# Patient Record
Sex: FEMALE | Race: WHITE | Employment: UNEMPLOYED | URBAN - METROPOLITAN AREA
[De-identification: names, ages, dates, MRNs, and addresses within clinical notes are randomized per-mention and may not be internally consistent; named-entity substitution may affect disease eponyms.]

---

## 2020-02-26 ENCOUNTER — APPOINTMENT (OUTPATIENT)
Dept: GENERAL RADIOLOGY | Age: 55
End: 2020-02-26
Payer: MEDICAID

## 2020-02-26 ENCOUNTER — HOSPITAL ENCOUNTER (EMERGENCY)
Age: 55
Discharge: HOME | End: 2020-02-26
Attending: EMERGENCY MEDICINE
Payer: MEDICAID

## 2020-02-26 VITALS
WEIGHT: 175.04 LBS | HEIGHT: 64 IN | HEART RATE: 60 BPM | OXYGEN SATURATION: 99 % | DIASTOLIC BLOOD PRESSURE: 60 MMHG | SYSTOLIC BLOOD PRESSURE: 120 MMHG | TEMPERATURE: 98 F | RESPIRATION RATE: 16 BRPM | BODY MASS INDEX: 29.88 KG/M2

## 2020-02-26 PROCEDURE — 71101 X-RAY EXAM UNILAT RIBS/CHEST: CPT

## 2020-02-26 PROCEDURE — 99283 EMERGENCY DEPT VISIT LOW MDM: CPT

## 2020-02-26 RX ORDER — ATORVASTATIN CALCIUM 40 MG/1
TABLET, FILM COATED ORAL
COMMUNITY
Start: 2020-02-20

## 2020-02-26 RX ORDER — HYDROCODONE BITARTRATE AND ACETAMINOPHEN 5; 325 MG/1; MG/1
1 TABLET ORAL EVERY 6 HOURS PRN
Qty: 12 TABLET | Refills: 0 | Status: SHIPPED | OUTPATIENT
Start: 2020-02-26 | End: 2020-02-29

## 2020-02-26 RX ORDER — CLONAZEPAM 0.5 MG/1
TABLET ORAL
COMMUNITY
Start: 2020-02-21

## 2020-02-26 ASSESSMENT — PAIN SCALES - GENERAL
PAINLEVEL_OUTOF10: 7
PAINLEVEL_OUTOF10: 6

## 2020-02-26 ASSESSMENT — PAIN DESCRIPTION - PAIN TYPE
TYPE: ACUTE PAIN
TYPE: ACUTE PAIN

## 2020-02-26 ASSESSMENT — PAIN DESCRIPTION - ONSET: ONSET: SUDDEN

## 2020-02-26 ASSESSMENT — PAIN DESCRIPTION - LOCATION
LOCATION: FLANK
LOCATION: RIB CAGE

## 2020-02-26 ASSESSMENT — PAIN DESCRIPTION - ORIENTATION
ORIENTATION: RIGHT
ORIENTATION: RIGHT

## 2020-02-26 ASSESSMENT — PAIN DESCRIPTION - FREQUENCY
FREQUENCY: INTERMITTENT
FREQUENCY: CONTINUOUS

## 2020-02-26 ASSESSMENT — PAIN DESCRIPTION - DESCRIPTORS
DESCRIPTORS: ACHING
DESCRIPTORS: SHARP

## 2020-02-26 ASSESSMENT — PAIN - FUNCTIONAL ASSESSMENT
PAIN_FUNCTIONAL_ASSESSMENT: ACTIVITIES ARE NOT PREVENTED
PAIN_FUNCTIONAL_ASSESSMENT: 0-10
PAIN_FUNCTIONAL_ASSESSMENT: PREVENTS OR INTERFERES SOME ACTIVE ACTIVITIES AND ADLS

## 2020-02-26 ASSESSMENT — PAIN DESCRIPTION - PROGRESSION: CLINICAL_PROGRESSION: NOT CHANGED

## 2020-02-26 NOTE — ED PROVIDER NOTES
[facility]  eMERGENCY dEPARTMENT eNCOUnter      Pt Name: Meng Meza  MRN: [id number]  [place] 1965  Date of evaluation: 2/26/2020  Provider: Mini Underwood MD    CHIEF COMPLAINT       Chief Complaint   Patient presents with    Rib Pain     right rib pain accidentally slammed by a car door 1 week ago         HISTORY OF PRESENT ILLNESS  (Location/Symptom, Timing/Onset, Context/Setting, Quality, Duration, Modifying Factors, Severity.)   Meng Meza is a 47 y.o. female who presents to the emergency department for complaint of right rib pain for 1 week due to hitting herself accidentally with a car door. Reports pain starts under her shoulder and radiates across to her chest. States pain is worse with movement and coughing. Reports feeling a \"popping\" sensation when she raises her arm. Denies shortness of breath or coughing up blood. Reports taking Tylenol last night to help with pain. Nursing Notes were reviewed and I agree. REVIEW OF SYSTEMS    (2-9 systems for level 4, 10 or more for level 5)     Cardiac: Denies chest pain  Respiratory: Denies shortness of breath or coughing up blood. She has a chronic smoker's cough which is no worse than usual.  Abdomen: Denies abdominal pain, nausea or vomiting  MSK: Reports right sided rib pain     Except as noted above the remainder of the review of systems was reviewed and negative.        PAST MEDICAL HISTORY         Diagnosis Date    HTN (hypertension)     LDL (low density lipoprotein receptor disorder)     Panic disorder     Smokers' cough ([address]        SURGICAL HISTORY           Procedure Laterality Date    TUBAL LIGATION         CURRENT MEDICATIONS       Discharge Medication List as of 2/26/2020 12:49 PM      CONTINUE these medications which have NOT CHANGED    Details   atorvastatin (LIPITOR) 40 MG tablet Historical Med      sertraline (ZOLOFT) 50 MG tablet Historical Med      clonazePAM (KLONOPIN) 0.5 MG tablet Historical Med      BREO ELLIPTA 200-25 MCG/INH AEPB inhaler DAWHistorical Med      cetirizine (ZYRTEC) 5 MG tablet Take 10 mg by mouth daily       albuterol sulfate  (90 BASE) MCG/ACT inhaler Inhale 2 puffs into the lungs every 6 hours as needed for Wheezing      lisinopril (PRINIVIL;ZESTRIL) 40 MG tablet Take 40 mg by mouth daily      albuterol (PROVENTIL) (2.5 MG/3ML) 0.083% nebulizer solution Take 2.5 mg by nebulization every 6 hours as needed. ALLERGIES     Stadol [butorphanol]    FAMILY HISTORY     History reviewed. No pertinent family history. No family status information on file. SOCIAL HISTORY      reports that she has been smoking cigarettes. She has a 30.00 pack-year smoking history. She has never used smokeless tobacco. She reports current alcohol use. She reports that she does not use drugs. PHYSICAL EXAM    (up to 7 for level 4, 8 or more for level 5)     ED Triage Vitals [02/26/20 1157]   BP Temp Temp Source Pulse Resp SpO2 Height Weight   122/61 97.7 °F (36.5 °C) Oral 61 16 98 % 5' 4\" (1.626 m) 175 lb 0.7 oz (79.4 kg)       General: Alert and oriented x 4, in no acute distress. Respiratory: Lung sounds clear throughout all lung fields. No rhonchi, wheezes, rales, or stridor. Speaking in clear and full sentences. Cardiac: Normal S1S2. No murmurs or gallops  Abdomen: Active bowel sounds in all four quadrants. Soft and non tender. No organomegaly. MSK  Ribs: No bony step offs. No crepitus. Tenderness at mid anterior lateral right side  Skin: Warm to touch and appropriate for ethnicity. No ecchymosis, abrasions, or lacerations.          DIAGNOSTIC RESULTS     RADIOLOGY:   Non-plain film images such as CT, Ultrasound and MRI are read by the radiologist. Plain radiographic images are visualized and preliminarily interpreted by Estephanie Iraheta MD with the below findings:      Interpretation per the Radiologist below, if available at the time of this note:    XR RIBS RIGHT INCLUDE CHEST (MIN 3 VIEWS)   Final Result   Acute mildly displaced fracture laterally in the right 4th rib. No pleural   effusion or pneumothorax. Clear lungs. Mild bullous changes. LABS:  Labs Reviewed - No data to display    All other labs were within normal range or not returned as of this dictation. EMERGENCY DEPARTMENT COURSE and DIFFERENTIAL DIAGNOSIS/MDM:   Vitals:    Vitals:    02/26/20 1157 02/26/20 1245   BP: 122/61 120/60   Pulse: 61 60   Resp: 16 16   Temp: 97.7 °F (36.5 °C) 98 °F (36.7 °C)   TempSrc: Oral Oral   SpO2: 98% 99%   Weight: 175 lb 0.7 oz (79.4 kg)    Height: 5' 4\" (1.626 m)        This patient was hit by a car door in her right ribs. She has had some persistent pain which is worse with movement and deep breathing. She has had no shortness of breath or hemoptysis. She has some point tenderness over her right lateral mid chest wall in the midaxillary line around the fourth and fifth rib region. She has no crepitance. Chest x-ray shows a right fourth rib fracture which is not significantly displaced. There is no evidence of pneumothorax, hemothorax, or other acute cardiopulmonary abnormality. She will be treated symptomatically with short-term pain control. She was instructed on the importance of remaining active and using incentive spirometry. She has incentive spirometer at home. She was warned of the symptoms that should prompt her return including but not limited to coughing up blood, shortness of breath, fever. She is to follow-up with her primary care provider as instructed. X-ray results, diagnosis, and treatment plan were discussed with the patient. She understands the treatment plan and follow-up as discussed.     Controlled Substance Monitoring:    Acute and Chronic Pain Monitoring:   RX Monitoring 2/26/2020   Periodic Controlled Substance Monitoring Possible medication side effects, risk of tolerance/dependence & alternative treatments

## 2022-10-13 ENCOUNTER — HOSPITAL ENCOUNTER (EMERGENCY)
Age: 57
Discharge: HOME | End: 2022-10-13
Attending: EMERGENCY MEDICINE
Payer: COMMERCIAL

## 2022-10-13 ENCOUNTER — APPOINTMENT (OUTPATIENT)
Dept: GENERAL RADIOLOGY | Age: 57
End: 2022-10-13
Payer: COMMERCIAL

## 2022-10-13 VITALS
HEIGHT: 64 IN | DIASTOLIC BLOOD PRESSURE: 80 MMHG | BODY MASS INDEX: 27.36 KG/M2 | RESPIRATION RATE: 18 BRPM | OXYGEN SATURATION: 96 % | TEMPERATURE: 97.6 F | SYSTOLIC BLOOD PRESSURE: 115 MMHG | HEART RATE: 70 BPM | WEIGHT: 160.27 LBS

## 2022-10-13 DIAGNOSIS — S29.011A CHEST WALL MUSCLE STRAIN, INITIAL ENCOUNTER: Primary | ICD-10-CM

## 2022-10-13 PROCEDURE — 71101 X-RAY EXAM UNILAT RIBS/CHEST: CPT

## 2022-10-13 PROCEDURE — 99283 EMERGENCY DEPT VISIT LOW MDM: CPT

## 2022-10-13 ASSESSMENT — PAIN DESCRIPTION - FREQUENCY: FREQUENCY: CONTINUOUS

## 2022-10-13 ASSESSMENT — PAIN DESCRIPTION - LOCATION: LOCATION: RIB CAGE

## 2022-10-13 ASSESSMENT — PAIN DESCRIPTION - PAIN TYPE: TYPE: ACUTE PAIN

## 2022-10-13 ASSESSMENT — PAIN DESCRIPTION - ORIENTATION: ORIENTATION: LEFT;MID

## 2022-10-13 ASSESSMENT — PAIN DESCRIPTION - DESCRIPTORS: DESCRIPTORS: SHARP;SHOOTING

## 2022-10-13 ASSESSMENT — PAIN - FUNCTIONAL ASSESSMENT: PAIN_FUNCTIONAL_ASSESSMENT: 0-10

## 2022-10-13 NOTE — DISCHARGE INSTRUCTIONS
May take Naprosyn or Aleve or Advil is fine for this. Take Robitussin-DM to help with your cough. No evidence of infection. No pneumonia. Decrease smoking. Should get better in couple weeks. Follow-up.

## 2022-10-13 NOTE — ED PROVIDER NOTES
eMERGENCY dEPARTMENT eNCOUnter      Pt Name: Lucila Smith  MRN: [id number]  [place] 1965  Date of evaluation: 10/13/2022  Provider: Gregorio Tony MD     [address] [place] [address]       Chief Complaint   Patient presents with    Rib Pain (injury)     Patient arrives to Ed with complaint of possible \"cracked\"rib on left side since last night . Patient states no known injury but has been coughing for 5 days . She called her pcp and couldn't get in. Patient rates pain 9/10, and denies otc pain medication today but took tylenol yesterday. HISTORY OF PRESENT ILLNESS   (Location/Symptom, Timing/Onset,Context/Setting, Quality, Duration, Modifying Factors, Severity) Note limiting factors. HPI    Lucila Smith is a 62 y.o. female who presents to the emergency department with left-sided rib pain for about 5 days. Patient states she is a COPD year continues to smoke has been coughing for about 5 days. Patient was unable to get to her doctor because her doctor has been busy and it is booked. Patient states now it hurts to take a deep breath. There has been no trauma. Patient denies falling but it hurts to palpate on that left rib area. Patient is concerned for a rib fracture. Patient denies shortness of breath. Patient is on an inhaler for her COPD. There has been no fever. She denies any chest pain except for reproducible chest wall pain. Nursing Notes were reviewed. REVIEW OFSYSTEMS    (2+ for level 4; 10+ for level 5)   Review of Systems    General: No fevers, chills or night sweats, No weight loss    Head:  No Sore throat,  No Ear Pain    Chest:  Nontender. Positive cough, no SOB,  Chest Pain wall pain is positive    GI: No abdominal pain or vomiting    : No dysuria or hematuria    Musculoskeletal: No unrelenting pain or night pain    Neurologic: No bowel or bladder incontinence, No saddle anesthesia, No leg weakness    All other systems reviewed and are negative.         PAST MEDICAL HISTORY Past Medical History:   Diagnosis Date    COPD (chronic obstructive pulmonary disease) (HCC)     HTN (hypertension)     LDL (low density lipoprotein receptor disorder)     Panic disorder     Smokers' cough ([address]        SURGICAL HISTORY       Past Surgical History:   Procedure Laterality Date    TUBAL LIGATION         CURRENT MEDICATIONS       Previous Medications    ALBUTEROL (PROVENTIL) (2.5 MG/3ML) 0.083% NEBULIZER SOLUTION    Take 2.5 mg by nebulization every 6 hours as needed. ALBUTEROL SULFATE  (90 BASE) MCG/ACT INHALER    Inhale 2 puffs into the lungs every 6 hours as needed for Wheezing    ATORVASTATIN (LIPITOR) 40 MG TABLET        BREO ELLIPTA 200-25 MCG/INH AEPB INHALER        CETIRIZINE (ZYRTEC) 5 MG TABLET    Take 10 mg by mouth daily     CLONAZEPAM (KLONOPIN) 0.5 MG TABLET        LISINOPRIL (PRINIVIL;ZESTRIL) 40 MG TABLET    Take 40 mg by mouth daily    SERTRALINE (ZOLOFT) 50 MG TABLET           ALLERGIES     Stadol [butorphanol]    FAMILY HISTORY     History reviewed. No pertinent family history. SOCIAL HISTORY       Social History     Socioeconomic History    Marital status:      Spouse name: None    Number of children: None    Years of education: None    Highest education level: None   Tobacco Use    Smoking status: Every Day     Packs/day: 1.00     Years: 30.00     Pack years: 30.00     Types: Cigarettes    Smokeless tobacco: Never   Vaping Use    Vaping Use: Former   Substance and Sexual Activity    Alcohol use: Not Currently     Alcohol/week: 5.0 - 6.0 standard drinks     Types: 5 - 6 Cans of beer per week    Drug use: No       SCREENINGS           PHYSICAL EXAM    (up to 7 for level 4, 8 or more for level 5)     ED Triage Vitals [10/13/22 0947]   BP Temp Temp Source Heart Rate Resp SpO2 Height Weight   115/80 97.6 °F (36.4 °C) Tympanic 70 18 96 % 5' 4\" (1.626 m) 160 lb 4.4 oz (72.7 kg)       Physical Exam    General: Alert and awake ×3. Nontoxic appearance. Well-developed well-nourished 60-year-old in no distress  HEENT: Normocephalic atraumatic. Neck is supple. Airway intact. No adenopathy  Cardiac: Regular rate and rhythm with no murmurs rubs or gallops. Positive chest wall tenderness on the left side. Pulmonary: Lungs are clear in all lung fields. No wheezing. No Rales. Abdomen: Soft and nontender. Negative hepatosplenomegaly. Bowel sounds are active  Extremities: Moving all extremities. No calf tenderness. Peripheral pulses all intact  Skin: No skin lesions. No rashes. I do not see any evidence of rash or shingles on that left dermatome area of her chest  Neurologic: Cranial nerves II through XII was grossly intact. Nonfocal neurological exam  Psychiatric: Patient is pleasant. Mood is appropriate. DIAGNOSTIC RESULTS     EKG (Per Emergency Physician):       RADIOLOGY (Per Emergency Physician): Interpretation per the Radiologist below, if available at the time of this note:  XR RIBS LEFT INCLUDE CHEST (MIN 3 VIEWS)    Result Date: 10/13/2022  EXAMINATION: 4 XRAY VIEWS OF THE LEFT RIBS WITH FRONTAL XRAY VIEW OF THE CHEST 10/13/2022 10:02 am COMPARISON: None. HISTORY: ORDERING SYSTEM PROVIDED HISTORY: pain TECHNOLOGIST PROVIDED HISTORY: Reason for exam:->pain Reason for Exam: complaint of possible \"cracked\"rib on left side since last night . Patient states no known injury but has been coughing for 5 days FINDINGS: There is no consolidation, pneumothorax or effusion. Heart size and vascularity are normal.  Examination of the left ribs reveals no fracture or malalignment. ED BEDSIDE ULTRASOUND:   Performed by ED Physician - none    LABS:  Labs Reviewed - No data to display     All other labs were within normal range or not returned as of this dictation.       Procedures      EMERGENCY DEPARTMENT COURSE and DIFFERENTIAL DIAGNOSIS/MDM:   Vitals:    Vitals:    10/13/22 0947   BP: 115/80   Pulse: 70   Resp: 18   Temp: 97.6 °F (36.4 °C) TempSrc: Tympanic   SpO2: 96%   Weight: 160 lb 4.4 oz (72.7 kg)   Height: 5' 4\" (1.626 m)       Medications - No data to display    MDM  . Patient is a 45-year-old COPD continues to smoke with left-sided chest pain. It is reproducible. Exam is reassuring. Differential diagnosis includes pneumonia, rib fracture, contusion, rib muscle strain, pneumothorax, costochondritis. X-ray was obtained of the ribs to rule out fracture. Results of that reveals no evidence of fracture no pneumonia no pneumothorax. I suspect patient has a pulled muscle or muscle strain of her chest wall from coughing. Recommend Robitussin-DM. Naprosyn Aleve or any other NSAIDs should help with the pain. Reassurance for patient. Patient discharged in good condition. Follow-up in a week if not improved. REVAL:         CRITICAL CARE TIME   Total CriticalCare time was 0 minutes, excluding separately reportable procedures. There was a high probability of clinically significant/life threatening deterioration in the patient's condition which required my urgent intervention. CONSULTS:  None    PROCEDURES:  Unless otherwise noted below, none     [unfilled]    FINAL IMPRESSION      1. Chest wall muscle strain, initial encounter          DISPOSITION/PLAN   DISPOSITION Decision To Discharge 10/13/2022 10:23:38 AM      PATIENT REFERRED TO:  Asha Ugarte MD  [address]  [phone]    Schedule an appointment as soon as possible for a visit in 1 week  If symptoms worsen    DISCHARGE MEDICATIONS:  New Prescriptions    No medications on file          (Please note:  Portions of this note were completed with a voice recognition program.Efforts were made to edit the dictations but occasionally words and phrases are mis-transcribed.)  Form [id number]    ROXANE CRABTREE MD (electronically signed)  Emergency Medicine Provider       Víctor Cortez MD  10/13/22 2670

## 2022-10-13 NOTE — ED NOTES
Discharge instructions reviewed. Patient verbalized understanding.        Tito Manuel RN  10/13/22 0162

## 2024-08-07 ENCOUNTER — HOSPITAL ENCOUNTER (EMERGENCY)
Age: 59
Discharge: HOME | End: 2024-08-07
Attending: EMERGENCY MEDICINE
Payer: COMMERCIAL

## 2024-08-07 ENCOUNTER — APPOINTMENT (OUTPATIENT)
Dept: GENERAL RADIOLOGY | Age: 59
End: 2024-08-07
Payer: COMMERCIAL

## 2024-08-07 VITALS
HEIGHT: 64 IN | WEIGHT: 174.16 LBS | RESPIRATION RATE: 18 BRPM | BODY MASS INDEX: 29.73 KG/M2 | DIASTOLIC BLOOD PRESSURE: 76 MMHG | OXYGEN SATURATION: 98 % | TEMPERATURE: 98.2 F | SYSTOLIC BLOOD PRESSURE: 138 MMHG | HEART RATE: 88 BPM

## 2024-08-07 DIAGNOSIS — S22.32XA CLOSED FRACTURE OF ONE RIB OF LEFT SIDE, INITIAL ENCOUNTER: Primary | ICD-10-CM

## 2024-08-07 PROCEDURE — 6370000000 HC RX 637 (ALT 250 FOR IP): Performed by: EMERGENCY MEDICINE

## 2024-08-07 PROCEDURE — 71101 X-RAY EXAM UNILAT RIBS/CHEST: CPT

## 2024-08-07 PROCEDURE — 99283 EMERGENCY DEPT VISIT LOW MDM: CPT

## 2024-08-07 RX ORDER — IBUPROFEN 600 MG/1
600 TABLET ORAL ONCE
Status: COMPLETED | OUTPATIENT
Start: 2024-08-07 | End: 2024-08-07

## 2024-08-07 RX ORDER — CLONAZEPAM 1 MG/1
1 TABLET ORAL DAILY PRN
COMMUNITY
Start: 2024-06-20

## 2024-08-07 RX ORDER — CYCLOBENZAPRINE HCL 10 MG
10 TABLET ORAL 2 TIMES DAILY PRN
COMMUNITY
Start: 2024-07-11

## 2024-08-07 RX ORDER — OXYCODONE HYDROCHLORIDE 5 MG/1
5 TABLET ORAL ONCE
Status: COMPLETED | OUTPATIENT
Start: 2024-08-07 | End: 2024-08-07

## 2024-08-07 RX ORDER — BUPROPION HYDROCHLORIDE 75 MG/1
150 TABLET ORAL 2 TIMES DAILY
COMMUNITY
Start: 2024-08-05

## 2024-08-07 RX ORDER — OXYCODONE HYDROCHLORIDE AND ACETAMINOPHEN 5; 325 MG/1; MG/1
1 TABLET ORAL EVERY 6 HOURS PRN
Qty: 12 TABLET | Refills: 0 | Status: SHIPPED | OUTPATIENT
Start: 2024-08-07 | End: 2024-08-10

## 2024-08-07 RX ADMIN — IBUPROFEN 600 MG: 600 TABLET, FILM COATED ORAL at 19:14

## 2024-08-07 RX ADMIN — OXYCODONE 5 MG: 5 TABLET ORAL at 19:14

## 2024-08-07 ASSESSMENT — PAIN DESCRIPTION - LOCATION
LOCATION: RIB CAGE;BACK
LOCATION: RIB CAGE;BACK

## 2024-08-07 ASSESSMENT — LIFESTYLE VARIABLES
HOW OFTEN DO YOU HAVE A DRINK CONTAINING ALCOHOL: 4 OR MORE TIMES A WEEK
HOW MANY STANDARD DRINKS CONTAINING ALCOHOL DO YOU HAVE ON A TYPICAL DAY: 1 OR 2

## 2024-08-07 ASSESSMENT — PAIN DESCRIPTION - ORIENTATION
ORIENTATION: LEFT
ORIENTATION: LEFT

## 2024-08-07 ASSESSMENT — PAIN SCALES - GENERAL
PAINLEVEL_OUTOF10: 10
PAINLEVEL_OUTOF10: 5

## 2024-08-07 ASSESSMENT — PAIN DESCRIPTION - DESCRIPTORS: DESCRIPTORS: SHARP

## 2024-08-07 NOTE — ED NOTES
Patient reports pain is better and is now a 5/10 on VAS. Patient states she has an incentive spirometer at home and she knows how to use it. Discharge instructions reviewed with patient and verbalized understanding, denies further questions and successful teach back occurred. Offered wheelchair for discharge and declined. Discharged ambulatory with steady gait to ED lobby to wait for her ride. Discharge instructions and prescription for Percocet provided to patient.

## 2024-08-07 NOTE — ED PROVIDER NOTES
[facility]  eMERGENCY dEPARTMENT eNCOUnter      Pt Name: Simone Ponce  MRN: [id number]  Birthdate 1965  Date of evaluation: 8/7/2024  Provider: RIYA VELARDE MD    CHIEF COMPLAINT       Chief Complaint   Patient presents with    Back Pain     Left sided back and rib pain.         CRITICAL CARE TIME   Total Critical Care time was 0 minutes, excluding separately reportable procedures.  There was a high probability of clinically significant/life threatening deterioration in the patient's condition which required my urgent intervention.        HISTORY OF PRESENT ILLNESS  (Location/Symptom, Timing/Onset, Context/Setting, Quality, Duration, Modifying Factors, Severity.)   History From: Patient  Limitations to history : None    Simone Ponce is a 58 y.o. female who presents to the emergency department complaining of left rib pain that started suddenly while she was coughing.  She felt a pop.  Occurred about 2 hours prior to arrival.  She has not taken anything for pain.  It is worse with movement.  It is worse with inspiration.  No abdominal pain.  Chronic cough which is unchanged.      Nursing Notes were reviewed and I agree.      SCREENINGS        [place] Coma Scale  Eye Opening: Spontaneous  Best Verbal Response: Oriented  Best Motor Response: Obeys commands  [place] Coma Scale Score: 15                CIWA Assessment  BP: 138/79  Pulse: 97           REVIEW OF SYSTEMS    (2-9 systems for level 4, 10 or more for level 5)     General: No fever or chills.  HEENT: Negative.  Cardiovascular: Left lower anterior rib pain that started while coughing.  Worse with inspiration.  Worse with movement.  Pulmonary: Chronic cough, unchanged.  No shortness of breath or hemoptysis.  GI: No abdominal pain nausea or vomiting.    Except as noted above the remainder of the review of systems was reviewed and negative.       PAST MEDICAL HISTORY     Past Medical History:   Diagnosis Date    COPD

## 2024-08-07 NOTE — DISCHARGE INSTRUCTIONS
Remain as active as possible.  Frequent deep breathing.  This will help you prevent complications.    Use ibuprofen and Tylenol every 6 hours as needed for pain.  If not relieved you can use oxycodone on as prescribed.    Follow-up in 7 to 10 days with your primary care provider.    As discussed if you develop shortness of breath or any other new symptoms of concern you can return at any time for reevaluation.

## 2024-08-07 NOTE — ED NOTES
Patient returned from radiology. Appears in obvious discomfort with frequent cough. Reports she coughs frequently and that she is trying to quit smoking. States pain in left rib to back area. Medicated for pain and denies further needs at this time.

## 2024-08-07 NOTE — ED TRIAGE NOTES
Left sided back and rib pain.   Pt states she heard a pop and exp acute pain after couching.  Occurred approx 2 hours.   Has not taken anything for pain.  Obvious pain to left rib upon light palpation.  Denies chest pain, SOB, nausea.  No obvious deformity noted.